# Patient Record
Sex: MALE | Race: OTHER | HISPANIC OR LATINO | URBAN - METROPOLITAN AREA
[De-identification: names, ages, dates, MRNs, and addresses within clinical notes are randomized per-mention and may not be internally consistent; named-entity substitution may affect disease eponyms.]

---

## 2021-07-23 ENCOUNTER — EMERGENCY (EMERGENCY)
Facility: HOSPITAL | Age: 26
LOS: 1 days | Discharge: HOME | End: 2021-07-23
Attending: EMERGENCY MEDICINE | Admitting: EMERGENCY MEDICINE
Payer: COMMERCIAL

## 2021-07-23 VITALS
TEMPERATURE: 99 F | HEIGHT: 66 IN | DIASTOLIC BLOOD PRESSURE: 73 MMHG | WEIGHT: 184.97 LBS | OXYGEN SATURATION: 99 % | SYSTOLIC BLOOD PRESSURE: 131 MMHG | HEART RATE: 115 BPM | RESPIRATION RATE: 18 BRPM

## 2021-07-23 DIAGNOSIS — R00.2 PALPITATIONS: ICD-10-CM

## 2021-07-23 DIAGNOSIS — R20.0 ANESTHESIA OF SKIN: ICD-10-CM

## 2021-07-23 DIAGNOSIS — Z20.822 CONTACT WITH AND (SUSPECTED) EXPOSURE TO COVID-19: ICD-10-CM

## 2021-07-23 DIAGNOSIS — R07.89 OTHER CHEST PAIN: ICD-10-CM

## 2021-07-23 LAB
AMPHET UR-MCNC: POSITIVE
BARBITURATES UR SCN-MCNC: NEGATIVE — SIGNIFICANT CHANGE UP
BENZODIAZ UR-MCNC: NEGATIVE — SIGNIFICANT CHANGE UP
COCAINE METAB.OTHER UR-MCNC: NEGATIVE — SIGNIFICANT CHANGE UP
METHADONE UR-MCNC: NEGATIVE — SIGNIFICANT CHANGE UP
OPIATES UR-MCNC: NEGATIVE — SIGNIFICANT CHANGE UP
PCP SPEC-MCNC: SIGNIFICANT CHANGE UP
PCP UR-MCNC: NEGATIVE — SIGNIFICANT CHANGE UP
THC UR QL: NEGATIVE — SIGNIFICANT CHANGE UP

## 2021-07-23 PROCEDURE — 85025 COMPLETE CBC W/AUTO DIFF WBC: CPT

## 2021-07-23 PROCEDURE — 99284 EMERGENCY DEPT VISIT MOD MDM: CPT

## 2021-07-23 PROCEDURE — 99283 EMERGENCY DEPT VISIT LOW MDM: CPT

## 2021-07-23 PROCEDURE — 36415 COLL VENOUS BLD VENIPUNCTURE: CPT

## 2021-07-23 PROCEDURE — 82550 ASSAY OF CK (CPK): CPT

## 2021-07-23 PROCEDURE — 93005 ELECTROCARDIOGRAM TRACING: CPT

## 2021-07-23 PROCEDURE — 87635 SARS-COV-2 COVID-19 AMP PRB: CPT

## 2021-07-23 PROCEDURE — 84484 ASSAY OF TROPONIN QUANT: CPT

## 2021-07-23 PROCEDURE — 80307 DRUG TEST PRSMV CHEM ANLYZR: CPT

## 2021-07-23 PROCEDURE — 80053 COMPREHEN METABOLIC PANEL: CPT

## 2021-07-23 PROCEDURE — 82553 CREATINE MB FRACTION: CPT

## 2021-07-23 RX ORDER — SODIUM CHLORIDE 9 MG/ML
1000 INJECTION INTRAMUSCULAR; INTRAVENOUS; SUBCUTANEOUS ONCE
Refills: 0 | Status: COMPLETED | OUTPATIENT
Start: 2021-07-23 | End: 2021-07-23

## 2021-07-23 RX ADMIN — SODIUM CHLORIDE 1000 MILLILITER(S): 9 INJECTION INTRAMUSCULAR; INTRAVENOUS; SUBCUTANEOUS at 22:47

## 2021-07-23 RX ADMIN — SODIUM CHLORIDE 1000 MILLILITER(S): 9 INJECTION INTRAMUSCULAR; INTRAVENOUS; SUBCUTANEOUS at 22:08

## 2021-07-23 NOTE — ED PROVIDER NOTE - CLINICAL SUMMARY MEDICAL DECISION MAKING FREE TEXT BOX
27 y/o M pt w/ no significant PMHx presents to the ED c/o sudden onset of CP with palpitations. He has not been sleeping well and is overwhelmed at work. Pt is taking a lot of caffeine and using Adderall to finish work. Pt is well appearing, non-toxic, and denies pain. Physical exam is unremarkable. HR improved during triage. Will check labs, hydrate, monitor HR, repeat another lab set of cardiac enzymes, and most likely discharge.

## 2021-07-23 NOTE — ED PROVIDER NOTE - CHPI ED SYMPTOMS NEG
no back pain/no cough/no dizziness/no fever/no nausea/no shortness of breath/no syncope/no vomiting/no chills

## 2021-07-23 NOTE — ED PROVIDER NOTE - CONSTITUTIONAL, MLM
normal... Well well appearing, awake, alert, oriented to person, place, time/situation and in no apparent distress.

## 2021-07-23 NOTE — ED PROVIDER NOTE - NSFOLLOWUPINSTRUCTIONS_ED_ALL_ED_FT
Palpitations      Palpitations are feelings that your heartbeat is irregular or is faster than normal. It may feel like your heart is fluttering or skipping a beat. Palpitations are usually not a serious problem. They may be caused by many things, including smoking, caffeine, alcohol, stress, and certain medicines or drugs. Most causes of palpitations are not serious. However, some palpitations can be a sign of a serious problem. You may need further tests to rule out serious medical problems.      Follow these instructions at home:                  Pay attention to any changes in your condition. Take these actions to help manage your symptoms:    Eating and drinking   •Avoid foods and drinks that may cause palpitations. These may include:  •Caffeinated coffee, tea, soft drinks, diet pills, and energy drinks.      •Chocolate.      •Alcohol.        Lifestyle   •Take steps to reduce your stress and anxiety. Things that can help you relax include:  •Yoga.      •Mind-body activities, such as deep breathing, meditation, or using words and images to create positive thoughts (guided imagery).      •Physical activity, such as swimming, jogging, or walking. Tell your health care provider if your palpitations increase with activity. If you have chest pain or shortness of breath with activity, do not continue the activity until you are seen by your health care provider.      •Biofeedback. This is a method that helps you learn to use your mind to control things in your body, such as your heartbeat.        • Do not use drugs, including cocaine or ecstasy. Do not use marijuana.      •Get plenty of rest and sleep. Keep a regular bed time.      General instructions     •Take over-the-counter and prescription medicines only as told by your health care provider.      • Do not use any products that contain nicotine or tobacco, such as cigarettes and e-cigarettes. If you need help quitting, ask your health care provider.      •Keep all follow-up visits as told by your health care provider. This is important. These may include visits for further testing if palpitations do not go away or get worse.        Contact a health care provider if you:    •Continue to have a fast or irregular heartbeat after 24 hours.      •Notice that your palpitations occur more often.        Get help right away if you:    •Have chest pain or shortness of breath.      •Have a severe headache.      •Feel dizzy or you faint.        Summary    •Palpitations are feelings that your heartbeat is irregular or is faster than normal. It may feel like your heart is fluttering or skipping a beat.      •Palpitations may be caused by many things, including smoking, caffeine, alcohol, stress, certain medicines, and drugs.      •Although most causes of palpitations are not serious, some causes can be a sign of a serious medical problem.      •Get help right away if you faint or have chest pain, shortness of breath, a severe headache, or dizziness.      This information is not intended to replace advice given to you by your health care provider. Make sure you discuss any questions you have with your health care provider.

## 2021-07-23 NOTE — ED ADULT TRIAGE NOTE - CHIEF COMPLAINT QUOTE
Patient arrives with complaints of chest discomfort since 5:45pm today, was sent to ED from urgent care for evaluation. EKG in progress. Denies SOB. Patient reports took Adderall 60mg PO at 8 am today, reports medication not prescribed to him. Patient arrives with complaints of chest pain since 5:45pm today, was sent to ED from urgent care for evaluation. EKG in progress. Denies SOB. Patient reports took Adderall 60mg PO at 8 am today, reports medication not prescribed to him.

## 2021-07-23 NOTE — ED PROVIDER NOTE - OBJECTIVE STATEMENT
25 y/o M pt w/ no significant PMHx presents to the ED c/o suddent onset of CP with palpitations, associated numbness and tingling of the fingers on the left hand started earlier today about 4 hours ago. In the past week, he was very overwhelmed with work with a project deadline that was to be finished today. Stated that he had minimal sleep, barely sleeping 3 to 4 hours per day. Pt said that he drank a lot of coffee to stay awake. On Wednesday, he noted CP and palpitations feeling exhausted and tired. Took a dose of non Rx Adderall, most likely of street origin, to help him with his workload on Wednesday. Took another dose of Adderall this morning. Pt then noted CP start this afternoon. Denies any other illicit drug use, smoking, cardiac hx, and FHx of cardiac disease. Admits to taking Adderall recreationally before and drinking alcohol sometimes, not daily or abusing.

## 2021-07-23 NOTE — ED PROVIDER NOTE - PATIENT PORTAL LINK FT
You can access the FollowMyHealth Patient Portal offered by [facility] by registering at the following website: [email or web address]. By joining [facility]’s FollowMyHealth portal, you will also be able to view your health information using other applications (apps) compatible with our system.

## 2021-07-23 NOTE — ED ADULT NURSE NOTE - OBJECTIVE STATEMENT
Pt presents reporting left sided chest pain for 1 wk, worse today with left arm tingling. Pt denies pmhx, denies family hx of heart attacks under 50.

## 2021-07-23 NOTE — ED ADULT NURSE NOTE - CHIEF COMPLAINT QUOTE
Patient arrives with complaints of chest pain since 5:45pm today, was sent to ED from urgent care for evaluation. EKG in progress. Denies SOB. Patient reports took Adderall 60mg PO at 8 am today, reports medication not prescribed to him.

## 2021-07-23 NOTE — ED PROVIDER NOTE - ATTENDING CONTRIBUTION TO CARE
25 yo , healthy , palpitations and CP that started in afternoon, overwhelmed at work last week, took caffeine and adderall ( no his RX), similar sensation earlier in week,   EKG wnl   L ok ,   tachy secondary to drug efffect/ abuse of Rx meds ,   pt stable for dc   pcp f/u    emergent return instructions were discussed with patient

## 2021-07-24 VITALS
HEART RATE: 98 BPM | DIASTOLIC BLOOD PRESSURE: 67 MMHG | OXYGEN SATURATION: 99 % | SYSTOLIC BLOOD PRESSURE: 123 MMHG | TEMPERATURE: 99 F | RESPIRATION RATE: 16 BRPM

## 2021-07-24 LAB
CK MB CFR SERPL CALC: 1.3 NG/ML — SIGNIFICANT CHANGE UP (ref 0–6.7)
CK SERPL-CCNC: 85 U/L — SIGNIFICANT CHANGE UP (ref 30–200)
TROPONIN T SERPL-MCNC: 0.01 NG/ML — SIGNIFICANT CHANGE UP (ref 0–0.01)